# Patient Record
Sex: MALE | Race: WHITE | Employment: UNEMPLOYED | URBAN - NONMETROPOLITAN AREA
[De-identification: names, ages, dates, MRNs, and addresses within clinical notes are randomized per-mention and may not be internally consistent; named-entity substitution may affect disease eponyms.]

---

## 2020-01-10 ENCOUNTER — APPOINTMENT (OUTPATIENT)
Dept: GENERAL RADIOLOGY | Age: 45
End: 2020-01-10
Payer: COMMERCIAL

## 2020-01-10 ENCOUNTER — HOSPITAL ENCOUNTER (EMERGENCY)
Age: 45
Discharge: HOME | End: 2020-01-10
Payer: COMMERCIAL

## 2020-01-10 VITALS
DIASTOLIC BLOOD PRESSURE: 88 MMHG | RESPIRATION RATE: 18 BRPM | TEMPERATURE: 98.4 F | HEART RATE: 87 BPM | WEIGHT: 200 LBS | OXYGEN SATURATION: 98 % | SYSTOLIC BLOOD PRESSURE: 152 MMHG

## 2020-01-10 PROCEDURE — 99283 EMERGENCY DEPT VISIT LOW MDM: CPT

## 2020-01-10 PROCEDURE — 72040 X-RAY EXAM NECK SPINE 2-3 VW: CPT

## 2020-01-10 RX ORDER — CYCLOBENZAPRINE HCL 10 MG
10 TABLET ORAL NIGHTLY PRN
Qty: 21 TABLET | Refills: 0 | Status: SHIPPED | OUTPATIENT
Start: 2020-01-10 | End: 2020-01-17

## 2020-01-10 RX ORDER — NAPROXEN 500 MG/1
500 TABLET ORAL 2 TIMES DAILY
Qty: 14 TABLET | Refills: 0 | Status: SHIPPED | OUTPATIENT
Start: 2020-01-10 | End: 2022-08-01

## 2020-01-10 ASSESSMENT — PAIN DESCRIPTION - PAIN TYPE: TYPE: ACUTE PAIN

## 2020-01-10 ASSESSMENT — PAIN DESCRIPTION - DESCRIPTORS: DESCRIPTORS: ACHING

## 2020-01-10 ASSESSMENT — PAIN DESCRIPTION - LOCATION: LOCATION: NECK

## 2020-01-10 ASSESSMENT — PAIN DESCRIPTION - ORIENTATION: ORIENTATION: POSTERIOR

## 2020-01-10 ASSESSMENT — PAIN SCALES - GENERAL: PAINLEVEL_OUTOF10: 8

## 2020-01-10 NOTE — ED PROVIDER NOTES
[facility] [place] ED  EMERGENCY DEPARTMENT ENCOUNTER      Pt Name: Christina Watson  MRN: [id number]  [place] 1975  Date of evaluation: 1/10/2020  Provider: Suzette Meyer PA-C    CHIEF COMPLAINT       Chief Complaint   Patient presents with    Neck Pain     pt states ongoing for 15 years. Pt states he has DDD and arthritis       HISTORY OF PRESENT ILLNESS    Christina Watson is a 38 y.o. male who presents to the emergency department from home complains of pain in his neck ongoing for 15 years he has been told that he had generative disc disease and arthritis he states they wanted to do surgery on him about 5 years ago but he did not want to have that done now he starting to have pain shooting into his left arm over the past few months that he thinks is getting worse he wants to follow-up and see about what treatments he can get but he does not have a family doctor or anyone is taking care of his neck at this time he states he realizes the first few steps in the process of getting this taken care of but came here to see if we can help him start the process of having his neck checked out. Denies any new injury. This is a progressively worsening problem. Denies weakness. Triage notes and Nursing notes were reviewed by myself. Any discrepancies are addressed above. PAST MEDICAL HISTORY   No past medical history on file. SURGICAL HISTORY     No past surgical history on file. CURRENT MEDICATIONS       Discharge Medication List as of 1/10/2020 11:53 AM          ALLERGIES     Codeine    FAMILY HISTORY     No family history on file.      SOCIAL HISTORY       Social History     Socioeconomic History    Marital status: Single     Spouse name: Not on file    Number of children: Not on file    Years of education: Not on file    Highest education level: Not on file   Occupational History    Not on file   Social Needs    Financial resource strain: Not on file    Food insecurity:     Worry:

## 2020-01-14 ENCOUNTER — HOSPITAL ENCOUNTER (EMERGENCY)
Age: 45
Discharge: HOME | End: 2020-01-14
Payer: COMMERCIAL

## 2020-01-14 VITALS
SYSTOLIC BLOOD PRESSURE: 129 MMHG | TEMPERATURE: 97.4 F | HEART RATE: 106 BPM | WEIGHT: 200 LBS | RESPIRATION RATE: 16 BRPM | OXYGEN SATURATION: 97 % | DIASTOLIC BLOOD PRESSURE: 83 MMHG

## 2020-01-14 PROCEDURE — 6370000000 HC RX 637 (ALT 250 FOR IP): Performed by: PHYSICIAN ASSISTANT

## 2020-01-14 PROCEDURE — 99282 EMERGENCY DEPT VISIT SF MDM: CPT

## 2020-01-14 RX ORDER — GABAPENTIN 400 MG/1
400 CAPSULE ORAL 2 TIMES DAILY
COMMUNITY
End: 2022-08-01

## 2020-01-14 RX ORDER — PREDNISONE 10 MG/1
40 TABLET ORAL DAILY
Qty: 20 TABLET | Refills: 0 | Status: SHIPPED | OUTPATIENT
Start: 2020-01-14 | End: 2020-01-19

## 2020-01-14 RX ORDER — TRAMADOL HYDROCHLORIDE 50 MG/1
50 TABLET ORAL ONCE
Status: COMPLETED | OUTPATIENT
Start: 2020-01-14 | End: 2020-01-14

## 2020-01-14 RX ADMIN — TRAMADOL HYDROCHLORIDE 50 MG: 50 TABLET, FILM COATED ORAL at 14:04

## 2020-01-14 ASSESSMENT — ENCOUNTER SYMPTOMS
SHORTNESS OF BREATH: 0
BLOOD IN STOOL: 0
EYE DISCHARGE: 0
RHINORRHEA: 0
SORE THROAT: 0
BACK PAIN: 0
NAUSEA: 0
COUGH: 0
CHEST TIGHTNESS: 0
DIARRHEA: 0
VOMITING: 0
WHEEZING: 0
CONSTIPATION: 0
EYE REDNESS: 0
ABDOMINAL PAIN: 0

## 2020-01-14 ASSESSMENT — PAIN DESCRIPTION - LOCATION: LOCATION: NECK

## 2020-01-14 ASSESSMENT — PAIN DESCRIPTION - PAIN TYPE: TYPE: CHRONIC PAIN

## 2020-01-14 ASSESSMENT — PAIN SCALES - GENERAL
PAINLEVEL_OUTOF10: 7
PAINLEVEL_OUTOF10: 7

## 2020-01-14 ASSESSMENT — PAIN DESCRIPTION - DESCRIPTORS: DESCRIPTORS: CONSTANT

## 2020-01-14 ASSESSMENT — PAIN DESCRIPTION - ORIENTATION: ORIENTATION: POSTERIOR

## 2020-01-14 NOTE — ED PROVIDER NOTES
Restoration service: None     Active member of club or organization: None     Attends meetings of clubs or organizations: None     Relationship status: None    Intimate partner violence:     Fear of current or ex partner: None     Emotionally abused: None     Physically abused: None     Forced sexual activity: None   Other Topics Concern    None   Social History Narrative    None       SCREENINGS      @FLOW([id number])@      PHYSICAL EXAM    (up to 7 for level 4, 8 or more for level 5)     ED Triage Vitals [01/14/20 1323]   BP Temp Temp Source Pulse Resp SpO2 Height Weight   129/83 97.4 °F (36.3 °C) Oral 106 16 97 % -- 200 lb (90.7 kg)       Physical Exam  Vitals signs and nursing note reviewed. Constitutional:       General: He is not in acute distress. Appearance: He is well-developed. He is not diaphoretic. HENT:      Head: Normocephalic and atraumatic. Right Ear: External ear normal.      Left Ear: External ear normal.   Eyes:      General: No scleral icterus. Right eye: No discharge. Left eye: No discharge. Conjunctiva/sclera: Conjunctivae normal.   Neck:      Musculoskeletal: Full passive range of motion without pain, normal range of motion and neck supple. Normal range of motion. Muscular tenderness present. No spinous process tenderness. Trachea: No tracheal deviation. Comments: There is tenderness noted to the cervical spine region. Full range of motion. Intact sensation  Cardiovascular:      Rate and Rhythm: Normal rate and regular rhythm. Pulmonary:      Effort: Pulmonary effort is normal. No respiratory distress. Breath sounds: No stridor. No rhonchi. Abdominal:      General: There is no distension. Tenderness: There is no tenderness. Musculoskeletal: Normal range of motion. General: No tenderness or deformity. Skin:     General: Skin is warm and dry. Findings: No erythema or rash.    Neurological:      Mental Status: He is alert

## 2022-04-14 ENCOUNTER — TELEPHONE (OUTPATIENT)
Dept: FAMILY MEDICINE CLINIC | Age: 47
End: 2022-04-14

## 2022-08-01 ENCOUNTER — HOSPITAL ENCOUNTER (INPATIENT)
Age: 47
LOS: 4 days | Discharge: HOME | DRG: 750 | End: 2022-08-05
Attending: EMERGENCY MEDICINE | Admitting: PSYCHIATRY & NEUROLOGY
Payer: COMMERCIAL

## 2022-08-01 DIAGNOSIS — F23 ACUTE PSYCHOSIS (HCC): Primary | ICD-10-CM

## 2022-08-01 LAB
ACETAMINOPHEN LEVEL: <5 UG/ML (ref 10–30)
ALBUMIN SERPL-MCNC: 3.8 G/DL (ref 3.5–5.2)
ALP BLD-CCNC: 68 U/L (ref 40–129)
ALT SERPL-CCNC: 32 U/L (ref 5–41)
AMPHETAMINE SCREEN URINE: NEGATIVE
ANION GAP SERPL CALCULATED.3IONS-SCNC: 10 MMOL/L (ref 9–17)
AST SERPL-CCNC: 25 U/L
BARBITURATE SCREEN URINE: NEGATIVE
BENZODIAZEPINE SCREEN, URINE: NEGATIVE
BILIRUB SERPL-MCNC: 0.25 MG/DL (ref 0.3–1.2)
BUN BLDV-MCNC: 8 MG/DL (ref 6–20)
CALCIUM SERPL-MCNC: 9.1 MG/DL (ref 8.6–10.4)
CANNABINOID SCREEN URINE: NEGATIVE
CHLORIDE BLD-SCNC: 103 MMOL/L (ref 98–107)
CO2: 23 MMOL/L (ref 20–31)
COCAINE METABOLITE, URINE: NEGATIVE
CREAT SERPL-MCNC: 0.72 MG/DL (ref 0.7–1.2)
ETHANOL PERCENT: <0.01 %
ETHANOL: <10 MG/DL
FENTANYL URINE: POSITIVE
GFR AFRICAN AMERICAN: >60 ML/MIN
GFR NON-AFRICAN AMERICAN: >60 ML/MIN
GFR SERPL CREATININE-BSD FRML MDRD: ABNORMAL ML/MIN/{1.73_M2}
GLUCOSE BLD-MCNC: 97 MG/DL (ref 70–99)
HCT VFR BLD CALC: 42.9 % (ref 41–53)
HEMOGLOBIN: 14.3 G/DL (ref 13.5–17.5)
MCH RBC QN AUTO: 30.2 PG (ref 26–34)
MCHC RBC AUTO-ENTMCNC: 33.3 G/DL (ref 31–37)
MCV RBC AUTO: 90.8 FL (ref 80–100)
METHADONE SCREEN, URINE: NEGATIVE
OPIATES, URINE: NEGATIVE
OXYCODONE SCREEN URINE: NEGATIVE
PDW BLD-RTO: 13.8 % (ref 11.5–14.9)
PHENCYCLIDINE, URINE: NEGATIVE
PLATELET # BLD: 280 K/UL (ref 150–450)
PMV BLD AUTO: 6.8 FL (ref 6–12)
POTASSIUM SERPL-SCNC: 3.4 MMOL/L (ref 3.7–5.3)
RBC # BLD: 4.73 M/UL (ref 4.5–5.9)
SALICYLATE LEVEL: <1 MG/DL (ref 3–10)
SODIUM BLD-SCNC: 136 MMOL/L (ref 135–144)
TEST INFORMATION: ABNORMAL
TOTAL PROTEIN: 6.8 G/DL (ref 6.4–8.3)
TOXIC TRICYCLIC SC,BLOOD: ABNORMAL
TRICYCLIC ANTIDEP,URINE: NEGATIVE
TSH SERPL DL<=0.05 MIU/L-ACNC: 2.2 UIU/ML (ref 0.3–5)
WBC # BLD: 9.7 K/UL (ref 3.5–11)

## 2022-08-01 PROCEDURE — 99285 EMERGENCY DEPT VISIT HI MDM: CPT

## 2022-08-01 PROCEDURE — 80307 DRUG TEST PRSMV CHEM ANLYZR: CPT

## 2022-08-01 PROCEDURE — 36415 COLL VENOUS BLD VENIPUNCTURE: CPT

## 2022-08-01 PROCEDURE — 80143 DRUG ASSAY ACETAMINOPHEN: CPT

## 2022-08-01 PROCEDURE — 80179 DRUG ASSAY SALICYLATE: CPT

## 2022-08-01 PROCEDURE — 80053 COMPREHEN METABOLIC PANEL: CPT

## 2022-08-01 PROCEDURE — 99223 1ST HOSP IP/OBS HIGH 75: CPT | Performed by: INTERNAL MEDICINE

## 2022-08-01 PROCEDURE — 6370000000 HC RX 637 (ALT 250 FOR IP): Performed by: PSYCHIATRY & NEUROLOGY

## 2022-08-01 PROCEDURE — G0480 DRUG TEST DEF 1-7 CLASSES: HCPCS

## 2022-08-01 PROCEDURE — 1240000000 HC EMOTIONAL WELLNESS R&B

## 2022-08-01 PROCEDURE — 84443 ASSAY THYROID STIM HORMONE: CPT

## 2022-08-01 PROCEDURE — 85027 COMPLETE CBC AUTOMATED: CPT

## 2022-08-01 PROCEDURE — 6370000000 HC RX 637 (ALT 250 FOR IP): Performed by: EMERGENCY MEDICINE

## 2022-08-01 RX ORDER — DIPHENHYDRAMINE HYDROCHLORIDE 50 MG/ML
50 INJECTION INTRAMUSCULAR; INTRAVENOUS EVERY 6 HOURS PRN
Status: DISCONTINUED | OUTPATIENT
Start: 2022-08-01 | End: 2022-08-05 | Stop reason: HOSPADM

## 2022-08-01 RX ORDER — TRAZODONE HYDROCHLORIDE 50 MG/1
50 TABLET ORAL NIGHTLY PRN
Status: DISCONTINUED | OUTPATIENT
Start: 2022-08-01 | End: 2022-08-05 | Stop reason: HOSPADM

## 2022-08-01 RX ORDER — POTASSIUM CHLORIDE 20 MEQ/1
40 TABLET, EXTENDED RELEASE ORAL ONCE
Status: COMPLETED | OUTPATIENT
Start: 2022-08-01 | End: 2022-08-01

## 2022-08-01 RX ORDER — ACETAMINOPHEN 325 MG/1
650 TABLET ORAL EVERY 6 HOURS PRN
Status: DISCONTINUED | OUTPATIENT
Start: 2022-08-01 | End: 2022-08-05 | Stop reason: HOSPADM

## 2022-08-01 RX ORDER — IBUPROFEN 400 MG/1
400 TABLET ORAL EVERY 6 HOURS PRN
Status: DISCONTINUED | OUTPATIENT
Start: 2022-08-01 | End: 2022-08-05 | Stop reason: HOSPADM

## 2022-08-01 RX ORDER — POLYETHYLENE GLYCOL 3350 17 G/17G
17 POWDER, FOR SOLUTION ORAL DAILY PRN
Status: DISCONTINUED | OUTPATIENT
Start: 2022-08-01 | End: 2022-08-05 | Stop reason: HOSPADM

## 2022-08-01 RX ORDER — HALOPERIDOL 5 MG
5 TABLET ORAL EVERY 6 HOURS PRN
Status: DISCONTINUED | OUTPATIENT
Start: 2022-08-01 | End: 2022-08-05 | Stop reason: HOSPADM

## 2022-08-01 RX ORDER — HALOPERIDOL 5 MG/ML
5 INJECTION INTRAMUSCULAR EVERY 6 HOURS PRN
Status: DISCONTINUED | OUTPATIENT
Start: 2022-08-01 | End: 2022-08-05 | Stop reason: HOSPADM

## 2022-08-01 RX ORDER — HYDROXYZINE 50 MG/1
50 TABLET, FILM COATED ORAL 3 TIMES DAILY PRN
Status: DISCONTINUED | OUTPATIENT
Start: 2022-08-01 | End: 2022-08-05 | Stop reason: HOSPADM

## 2022-08-01 RX ORDER — ASCORBIC ACID 500 MG
500 TABLET ORAL DAILY
Status: ON HOLD | COMMUNITY
End: 2022-08-04 | Stop reason: HOSPADM

## 2022-08-01 RX ORDER — MAGNESIUM HYDROXIDE/ALUMINUM HYDROXICE/SIMETHICONE 120; 1200; 1200 MG/30ML; MG/30ML; MG/30ML
30 SUSPENSION ORAL EVERY 6 HOURS PRN
Status: DISCONTINUED | OUTPATIENT
Start: 2022-08-01 | End: 2022-08-05 | Stop reason: HOSPADM

## 2022-08-01 RX ORDER — NICOTINE 21 MG/24HR
1 PATCH, TRANSDERMAL 24 HOURS TRANSDERMAL DAILY
Status: DISCONTINUED | OUTPATIENT
Start: 2022-08-01 | End: 2022-08-05 | Stop reason: HOSPADM

## 2022-08-01 RX ADMIN — ACETAMINOPHEN 650 MG: 325 TABLET, FILM COATED ORAL at 16:04

## 2022-08-01 RX ADMIN — POTASSIUM CHLORIDE 40 MEQ: 20 TABLET, EXTENDED RELEASE ORAL at 13:37

## 2022-08-01 RX ADMIN — HYDROXYZINE HYDROCHLORIDE 50 MG: 50 TABLET, FILM COATED ORAL at 16:02

## 2022-08-01 ASSESSMENT — PAIN - FUNCTIONAL ASSESSMENT: PAIN_FUNCTIONAL_ASSESSMENT: NONE - DENIES PAIN

## 2022-08-01 ASSESSMENT — SLEEP AND FATIGUE QUESTIONNAIRES
DO YOU HAVE DIFFICULTY SLEEPING: YES
DO YOU HAVE DIFFICULTY SLEEPING: YES
AVERAGE NUMBER OF SLEEP HOURS: 3
AVERAGE NUMBER OF SLEEP HOURS: 4
DO YOU USE A SLEEP AID: NO
SLEEP PATTERN: DIFFICULTY FALLING ASLEEP;EARLY AWAKENING

## 2022-08-01 ASSESSMENT — LIFESTYLE VARIABLES
HOW OFTEN DO YOU HAVE A DRINK CONTAINING ALCOHOL: NEVER
HOW MANY STANDARD DRINKS CONTAINING ALCOHOL DO YOU HAVE ON A TYPICAL DAY: PATIENT DOES NOT DRINK

## 2022-08-01 ASSESSMENT — PAIN SCALES - GENERAL
PAINLEVEL_OUTOF10: 0
PAINLEVEL_OUTOF10: 4

## 2022-08-01 ASSESSMENT — ENCOUNTER SYMPTOMS
COLOR CHANGE: 0
ABDOMINAL PAIN: 0
RHINORRHEA: 0
FACIAL SWELLING: 0
SHORTNESS OF BREATH: 0
BACK PAIN: 0
COUGH: 0
DIARRHEA: 0
CONSTIPATION: 0
CHEST TIGHTNESS: 0
SORE THROAT: 0
EYE PAIN: 0
VOMITING: 0
EYE REDNESS: 0
TROUBLE SWALLOWING: 0
SINUS PRESSURE: 0
BLOOD IN STOOL: 0
WHEEZING: 0
NAUSEA: 0
EYE DISCHARGE: 0

## 2022-08-01 ASSESSMENT — PAIN DESCRIPTION - LOCATION: LOCATION: HEAD
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
## 2022-08-01 NOTE — BH NOTE
[facility]  Admission Note     Admission Type:   Admission Type: Voluntary    Reason for admission:     Off medications, hearing voices telling him to hurt other people     Addictive Behavior:   Addictive Behavior  In the Past 3 Months, Have You Felt or Has Someone Told You That You Have a Problem With  : None    Medical Problems:   No past medical history on file. Status EXAM:  Mental Status and Behavioral Exam  Normal: No  Level of Assistance: Independent/Self  Facial Expression: Flat  Affect: Appropriate  Level of Consciousness: Alert  Frequency of Checks: 4 times per hour, close  Mood:Normal: No  Mood: Depressed, Anxious  Motor Activity:Normal: No  Motor Activity: Decreased  Eye Contact: Good  Observed Behavior: Cooperative  Sexual Misconduct History: Current - no  Preception: [place] to person, [place] to time, [place] to place, [place] to situation  Attention:Normal: No  Thought Processes: Other (comment) (within normal limits)  Thought Content:Normal: No  Thought Content: Preoccupations  Depression Symptoms: Change in energy level, Impaired concentration  Anxiety Symptoms: Generalized  Lori Symptoms: No problems reported or observed. Hallucinations: Auditory (comment) (man and a woman talking)  Delusions: No  Memory:Normal: Yes  Insight and Judgment: No  Insight and Judgment: Poor judgment, Poor insight    Tobacco Screening:  Practical Counseling, on admission, doc X, if applicable and completed (first 3 are required if patient doesn't refuse):             ( x) Recognizing danger situations (included triggers and roadblocks)                    ( x) Coping skills (new ways to manage stress,relaxation techniques, changing routine, distraction)                                                           (x ) Basic information about quitting (benefits of quitting, techniques in how to quit, available resources  ( ) Referral for counseling faxed to [facility] ( ) Patient refused counseling  ( ) Patient has not smoked in the last 30 days    Metabolic Screening:    No results found for: LABA1C    No results found for: CHOL  No results found for: TRIG  No results found for: HDL  No components found for: LDLCAL  No results found for: LABVLDL      Body mass index is 28.7 kg/m².     BP Readings from Last 2 Encounters:   08/01/22 125/87   01/14/20 129/83           Pt admitted with followings belongings:  Dental Appliances: None  Vision - Corrective Lenses: None  Hearing Aid: None  Jewelry: None  Body Piercings Removed: N/A  Clothing: Shirt, Shorts, Footwear  Other Valuables: Alli Anderson RN

## 2022-08-01 NOTE — ED PROVIDER NOTES
[address]  EMERGENCY DEPARTMENT ENCOUNTER      Pt Name: Esther Pérez  MRN: [id number]  [place] 1975  Date of evaluation: 8/1/22      CHIEF COMPLAINT       Chief Complaint   Patient presents with    Mental Health Problem         HISTORY OF PRESENT ILLNESS    Esther Pérez is a 56 y.o. male who presents complaining of Psychiatric Evaluation. Patient is here complaining of hearing voices telling him to hurt other people. Patient states because of this he will not leave his house for fear of hurting people. Patient states he does have a history of things like this before and has been hospitalized and has not been taking his medications for the last couple months. Patient denies drug or alcohol use. Patient denies suicidal ideation. Patient denies any somatic complaints. REVIEW OF SYSTEMS       Review of Systems   Constitutional:  Negative for activity change, appetite change, chills, diaphoresis and fever. HENT:  Negative for congestion, ear pain, facial swelling, nosebleeds, rhinorrhea, sinus pressure, sore throat and trouble swallowing. Eyes:  Negative for pain, discharge and redness. Respiratory:  Negative for cough, chest tightness, shortness of breath and wheezing. Cardiovascular:  Negative for chest pain, palpitations and leg swelling. Gastrointestinal:  Negative for abdominal pain, blood in stool, constipation, diarrhea, nausea and vomiting. Genitourinary:  Negative for difficulty urinating, dysuria, flank pain, frequency, genital sores and hematuria. Musculoskeletal:  Negative for arthralgias, back pain, gait problem, joint swelling, myalgias and neck pain. Skin:  Negative for color change, pallor, rash and wound. Neurological:  Negative for dizziness, tremors, seizures, syncope, speech difficulty, weakness, numbness and headaches. Psychiatric/Behavioral:  Positive for hallucinations.  Negative for confusion, decreased concentration, self-injury, sleep disturbance and suicidal ideas. Homicidal ideation     PAST MEDICAL HISTORY   No past medical history on file. SURGICAL HISTORY     No past surgical history on file. CURRENT MEDICATIONS       Previous Medications    GABAPENTIN (NEURONTIN) 400 MG CAPSULE    Take 400 mg by mouth 2 times daily. NAPROXEN (NAPROSYN) 500 MG TABLET    Take 1 tablet by mouth 2 times daily       ALLERGIES     is allergic to codeine. SOCIAL HISTORY      reports that he has been smoking cigarettes. He has been smoking an average of 1 pack per day. He has never used smokeless tobacco.    PHYSICAL EXAM     INITIAL VITALS: BP (!) 154/89   Pulse 98   Temp 98.4 °F (36.9 °C)   Resp 18   SpO2 99%      Physical Exam  Constitutional:       General: He is not in acute distress. Appearance: He is well-developed. He is not diaphoretic. HENT:      Head: Normocephalic and atraumatic. Eyes:      General: No scleral icterus. Right eye: No discharge. Left eye: No discharge. Conjunctiva/sclera: Conjunctivae normal.      Pupils: Pupils are equal, round, and reactive to light. Cardiovascular:      Rate and Rhythm: Normal rate and regular rhythm. Heart sounds: Normal heart sounds. No murmur heard. No friction rub. No gallop. Pulmonary:      Effort: Pulmonary effort is normal. No respiratory distress. Breath sounds: Normal breath sounds. No wheezing or rales. Neurological:      Mental Status: He is alert and oriented to person, place, and time. Psychiatric:         Attention and Perception: Attention normal.         Mood and Affect: Affect normal.         Speech: Speech normal.         Behavior: Behavior normal.         Thought Content: Thought content includes homicidal ideation. Thought content does not include suicidal ideation. DIAGNOSTIC RESULTS     LABS: All lab results were reviewed by myself, and all abnormals are listed below.   Labs Reviewed   TOX SCR, BLD, ED - Abnormal; Notable for the following components:       Result Value    Acetaminophen Level <5 (*)     Salicylate Lvl <1 (*)     All other components within normal limits   COMPREHENSIVE METABOLIC PANEL - Abnormal; Notable for the following components:    Potassium 3.4 (*)     Total Bilirubin 0.25 (*)     All other components within normal limits   URINE DRUG SCREEN - Abnormal; Notable for the following components:    Fentanyl, Ur POSITIVE (*)     All other components within normal limits   CBC   TSH WITH REFLEX   DRUG SCREEN TRICYCLIC URINE         MEDICAL DECISION MAKING:     We will do the medical work-up on this patient by think he would benefit from hospitalization. EMERGENCY DEPARTMENT COURSE:   Vitals:    Vitals:    08/01/22 1039 08/01/22 1041   BP: (!) 154/89    Pulse: 98    Resp: 18    Temp:  98.4 °F (36.9 °C)   SpO2: 99%        The patient was given the following medications while in the emergency department:  Orders Placed This Encounter   Medications    potassium chloride (KLOR-CON M) extended release tablet 40 mEq       -------------------------  1:06 PM EDT  Patient has been evaluated is medically cleared and will be admitted to [facility] for further psychiatric evaluation and treatment. FINAL IMPRESSION      1. Acute psychosis ([address]          DISPOSITION/PLAN   DISPOSITION Admitted 08/01/2022 01:04:56 PM      PATIENT REFERREDTO:  No follow-up provider specified.     DISCHARGEMEDICATIONS:  New Prescriptions    No medications on file       (Please note that portions of this note were completed with a voice recognition program.  Efforts were made to edit thedictations but occasionally words are mis-transcribed.)    Melia Valencia MD  Attending Emergency Physician                      Melia Valencia MD  08/01/22 7038

## 2022-08-01 NOTE — PROGRESS NOTES
Medication History completed:    New medications: vitamin C, vitamin D    Medications discontinued: gabapentin, naproxen    Changes to dosing: none    Stated allergies: The patient reports a rash with codeine. Other pertinent information: Medications confirmed with patient. He denies use of prescription medications.      Thank you,  Simba Bernal, PharmD, BCPS  [phone]

## 2022-08-02 PROBLEM — F25.0 SCHIZOAFFECTIVE DISORDER, BIPOLAR TYPE (HCC): Status: ACTIVE | Noted: 2022-08-02

## 2022-08-02 PROCEDURE — 90792 PSYCH DIAG EVAL W/MED SRVCS: CPT | Performed by: PSYCHIATRY & NEUROLOGY

## 2022-08-02 PROCEDURE — 6370000000 HC RX 637 (ALT 250 FOR IP): Performed by: PSYCHIATRY & NEUROLOGY

## 2022-08-02 PROCEDURE — APPSS60 APP SPLIT SHARED TIME 46-60 MINUTES

## 2022-08-02 PROCEDURE — 1240000000 HC EMOTIONAL WELLNESS R&B

## 2022-08-02 RX ORDER — PALIPERIDONE 3 MG/1
3 TABLET, EXTENDED RELEASE ORAL DAILY
Status: DISCONTINUED | OUTPATIENT
Start: 2022-08-02 | End: 2022-08-03

## 2022-08-02 RX ORDER — DIVALPROEX SODIUM 500 MG/1
500 TABLET, EXTENDED RELEASE ORAL DAILY
Status: DISCONTINUED | OUTPATIENT
Start: 2022-08-02 | End: 2022-08-05 | Stop reason: HOSPADM

## 2022-08-02 RX ADMIN — DIVALPROEX SODIUM 500 MG: 500 TABLET, EXTENDED RELEASE ORAL at 11:39

## 2022-08-02 RX ADMIN — PALIPERIDONE 3 MG: 3 TABLET, EXTENDED RELEASE ORAL at 11:39

## 2022-08-02 RX ADMIN — HYDROXYZINE HYDROCHLORIDE 50 MG: 50 TABLET, FILM COATED ORAL at 20:14

## 2022-08-02 RX ADMIN — HALOPERIDOL 5 MG: 5 TABLET ORAL at 08:33

## 2022-08-02 RX ADMIN — TRAZODONE HYDROCHLORIDE 50 MG: 50 TABLET ORAL at 20:44

## 2022-08-02 RX ADMIN — ACETAMINOPHEN 650 MG: 325 TABLET, FILM COATED ORAL at 08:33

## 2022-08-02 RX ADMIN — HYDROXYZINE HYDROCHLORIDE 50 MG: 50 TABLET, FILM COATED ORAL at 12:27

## 2022-08-02 ASSESSMENT — PAIN SCALES - GENERAL: PAINLEVEL_OUTOF10: 0

## 2022-08-02 ASSESSMENT — LIFESTYLE VARIABLES
HOW MANY STANDARD DRINKS CONTAINING ALCOHOL DO YOU HAVE ON A TYPICAL DAY: PATIENT DOES NOT DRINK
HOW OFTEN DO YOU HAVE A DRINK CONTAINING ALCOHOL: NEVER

## 2022-08-02 NOTE — PLAN OF CARE
[facility]  Initial Interdisciplinary Treatment Plan NO      Original treatment plan Date & Time: 8/2/2022 1259    Admission Type:  Admission Type: Voluntary    Reason for admission:        Estimated Length of Stay:  5-7days  Estimated Discharge Date: to be determined by physician    PATIENT STRENGTHS:  Patient Strengths:   Patient Strengths and Limitations:Limitations: Difficult relationships / poor social skills, Tendency to isolate self (Patient has auditory and visual hallucinations that make it uncomfortable to be around others.)  Addictive Behavior: Addictive Behavior  In the Past 3 Months, Have You Felt or Has Someone Told You That You Have a Problem With  : None  Medical Problems:No past medical history on file. Status EXAM:Mental Status and Behavioral Exam  Normal: No  Level of Assistance: Independent/Self  Facial Expression: Flat  Affect: Appropriate  Level of Consciousness: Alert  Frequency of Checks: 4 times per hour, close  Mood:Normal: No  Mood: Depressed, Anxious  Motor Activity:Normal: Yes  Motor Activity: Decreased  Eye Contact: Fair  Observed Behavior: Cooperative  Sexual Misconduct History: Current - no  Preception: [place] to person, [place] to place, [place] to time, [place] to situation  Attention:Normal: No  Attention: Distractible  Thought Processes: Circumstantial  Thought Content:Normal: No  Thought Content: Preoccupations  Depression Symptoms: Isolative, Loss of interest, Impaired concentration  Anxiety Symptoms: Generalized  Lori Symptoms: No problems reported or observed. Hallucinations:  Auditory (comment) (man and a women talking)  Delusions: No  Memory:Normal: Yes  Insight and Judgment: No  Insight and Judgment: Poor judgment, Poor insight    EDUCATION:   Learner Progress Toward Treatment Goals: reviewed group plans and strategies for care    Method:group therapy, medication compliance, individualized assessments and care planning    Outcome: needs reinforcement    PATIENT GOALS:   Short term: decrease auditory hallucinations and link with mental health facility  Long term: staying out of the hospital and staying on track    PLAN/TREATMENT RECOMMENDATIONS:     continue group therapy , medications compliance, goal setting, individualized assessments and care, continue to monitor pt on unit      SHORT-TERM GOALS:   Time frame for Short-Term Goals: 5-7 days    LONG-TERM GOALS:  Time frame for Long-Term Goals: 6 months  Members Present in Team Meeting: See Signature Sheet    Tyrone Cardenas RN

## 2022-08-02 NOTE — PLAN OF CARE
Problem: Anxiety  Goal: Will report anxiety at manageable levels  Description: INTERVENTIONS:  1. Administer medication as ordered  2. Teach and rehearse alternative coping skills  3. Provide emotional support with 1:1 interaction with staff  8/2/2022 1352 by Abdon Crawford LPN  Outcome: Progressing  8/2/2022 1352 by Abdon Crawford LPN  Outcome: Progressing  Patient states he will continue being compliant with meds and speak to staff if anxiety increases or worsens. 8/2/2022 1259 by Cindy Cuellar RN  Outcome: Progressing  8/1/2022 2352 by Renetta Mott RN  Outcome: Progressing  Flowsheets (Taken 8/1/2022 1506 by Kiley Junior RN)  Will report anxiety at manageable levels: Administer medication as ordered     Problem: Coping  Goal: Pt/Family able to verbalize concerns and demonstrate effective coping strategies  Description: INTERVENTIONS:  1. Assist patient/family to identify coping skills, available support systems and cultural and spiritual values  2. Provide emotional support, including active listening and acknowledgement of concerns of patient and caregivers  3. Reduce environmental stimuli, as able  4. Instruct patient/family in relaxation techniques, as appropriate  5. Assess for spiritual pain/suffering and initiate Spiritual Care, Psychosocial Clinical Specialist consults as needed  8/2/2022 1352 by Abdon Crawford LPN  Outcome: Progressing  8/2/2022 1352 by Abdon Crawford LPN  Outcome: Progressing  Patient states he is feeling progress towards working with his coping skills and will continue to work on this with staffs help.    8/2/2022 1259 by Cindy Cuellar RN  Outcome: Progressing  8/1/2022 2352 by Renetta Mott RN  Outcome: Progressing
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
## 2022-08-02 NOTE — H&P
use.  Drug Use:  has no history on file for drug use. Family History:     No family history on file. Review of Systems:     Positive and Negative as described in HPI. CONSTITUTIONAL:  negative for fevers, chills, sweats, fatigue, weight loss  HEENT:  negative for vision, hearing changes, runny nose, throat pain  RESPIRATORY:  negative for shortness of breath, cough, congestion, wheezing  CARDIOVASCULAR:  negative for chest pain, palpitations  GASTROINTESTINAL:  negative for nausea, vomiting, diarrhea, constipation, change in bowel habits, abdominal pain   GENITOURINARY:  negative for difficulty of urination, burning with urination, frequency   INTEGUMENT:  negative for rash, skin lesions, easy bruising   HEMATOLOGIC/LYMPHATIC:  negative for swelling/edema   ALLERGIC/IMMUNOLOGIC:  negative for urticaria , itching  ENDOCRINE:  negative increase in drinking, increase in urination, hot or cold intolerance  MUSCULOSKELETAL:  negative joint pains, muscle aches, swelling of joints  NEUROLOGICAL:  negative for headaches, dizziness, lightheadedness, numbness, pain, tingling extremities      Physical Exam:   /65   Pulse 88   Temp 98.7 °F (37.1 °C) (Oral)   Resp 12   Ht 5' 10\" (1.778 m)   Wt 200 lb (90.7 kg)   SpO2 99%   BMI 28.70 kg/m²   Temp (24hrs), Av.6 °F (37 °C), Min:98.4 °F (36.9 °C), Max:98.7 °F (37.1 °C)    No results for input(s): POCGLU in the last 72 hours.   No intake or output data in the 24 hours ending 22  Multiple tattoos noted including on the face  General Appearance: alert, well appearing, and in no acute distress  Mental status: oriented to person, place, and time  Head: normocephalic, atraumatic  Eye: no icterus, redness, pupils equal and reactive, extraocular eye movements intact, conjunctiva clear  Ear: normal external ear, no discharge, hearing intact  Nose: no drainage noted  Mouth: mucous membranes moist  Neck: supple, no carotid bruits, thyroid not palpable  Lungs: Bilateral equal air entry, clear to ausculation, no wheezing, rales or rhonchi, normal effort  Cardiovascular: normal rate, regular rhythm, no murmur, gallop, rub  Abdomen: Soft, nontender, nondistended, normal bowel sounds, no hepatomegaly or splenomegaly  Neurologic: There are no new focal motor or sensory deficits, normal muscle tone and bulk, no abnormal sensation, normal speech, cranial nerves II through XII grossly intact  Skin: No gross lesions, rashes, bruising or bleeding on exposed skin area  Extremities: peripheral pulses palpable, no pedal edema or calf pain with palpation      Investigations:      Laboratory Testing:  Recent Results (from the past 24 hour(s))   Urine Drug Screen    Collection Time: 08/01/22 12:06 PM   Result Value Ref Range    Amphetamine Screen, Ur NEGATIVE NEGATIVE    Barbiturate Screen, Ur NEGATIVE NEGATIVE    Benzodiazepine Screen, Urine NEGATIVE NEGATIVE    Cocaine Metabolite, Urine NEGATIVE NEGATIVE    Methadone Screen, Urine NEGATIVE NEGATIVE    Opiates, Urine NEGATIVE NEGATIVE    Phencyclidine, Urine NEGATIVE NEGATIVE    Cannabinoid Scrn, Ur NEGATIVE NEGATIVE    Oxycodone Screen, Ur NEGATIVE NEGATIVE    Fentanyl, Ur POSITIVE (A) NEGATIVE    Test Information       Assay provides medical screening only. The absence of expected drug(s) and/or metabolite(s) may indicate diluted or adulterated urine, limitations of testing or timing of collection.    Drug screen, tricyclic    Collection Time: 08/01/22 12:06 PM   Result Value Ref Range    Tricyclic Antidep,Urine NEGATIVE NEGATIVE   TOX SCR, BLD, ED    Collection Time: 08/01/22 12:07 PM   Result Value Ref Range    Acetaminophen Level <5 (L) 10 - 30 ug/mL    Ethanol <10 <10 mg/dL    Ethanol percent <8.874 %    Salicylate Lvl <1 (L) 3 - 10 mg/dL    Toxic Tricyclic Sc,Blood WRONG TEST ORDERED NEGATIVE   CBC    Collection Time: 08/01/22 12:07 PM   Result Value Ref Range    WBC 9.7 3.5 - 11.0 k/uL    RBC 4.73 4.5 - 5.9 m/uL Hemoglobin 14.3 13.5 - 17.5 g/dL    Hematocrit 42.9 41 - 53 %    MCV 90.8 80 - 100 fL    MCH 30.2 26 - 34 pg    MCHC 33.3 31 - 37 g/dL    RDW 13.8 11.5 - 14.9 %    Platelets 600 903 - 455 k/uL    MPV 6.8 6.0 - 12.0 fL   Comprehensive Metabolic Panel    Collection Time: 08/01/22 12:07 PM   Result Value Ref Range    Glucose 97 70 - 99 mg/dL    BUN 8 6 - 20 mg/dL    Creatinine 0.72 0.70 - 1.20 mg/dL    Calcium 9.1 8.6 - 10.4 mg/dL    Sodium 136 135 - 144 mmol/L    Potassium 3.4 (L) 3.7 - 5.3 mmol/L    Chloride 103 98 - 107 mmol/L    CO2 23 20 - 31 mmol/L    Anion Gap 10 9 - 17 mmol/L    Alkaline Phosphatase 68 40 - 129 U/L    ALT 32 5 - 41 U/L    AST 25 <40 U/L    Total Bilirubin 0.25 (L) 0.3 - 1.2 mg/dL    Total Protein 6.8 6.4 - 8.3 g/dL    Albumin 3.8 3.5 - 5.2 g/dL    GFR Non-African American >60 >60 mL/min    GFR African American >60 >60 mL/min    GFR Comment         TSH with Reflex    Collection Time: 08/01/22 12:07 PM   Result Value Ref Range    TSH 2.20 0.30 - 5.00 uIU/mL       Imaging/Diagnostics:  No results found. Assessment :      Hospital Problems             Last Modified POA    * (Principal) Acute psychosis ([facility] [address] 8/1/2022 Yes       Plan:   60-year-old gentleman  Hypokalemia potassium less than 3.540 M EQ's oral 1 time  Labs reviewed TSH 2.20  Urine tox positive for fentanyl  Will repeat labs and monitor electrolytes    Belia Beauchamp MD  8/1/2022  8:11 PM    Copy sent to Dr. Esther Nicholson primary care provider on file. Please note that this chart was generated using voice recognition Dragon dictation software. Although every effort was made to ensure the accuracy of this automated transcription, some errors in transcription may have occurred.
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
## 2022-08-02 NOTE — PLAN OF CARE
Problem: Anxiety  Goal: Will report anxiety at manageable levels  Outcome: Progressing     Problem: Coping  Goal: Pt/Family able to verbalize concerns and demonstrate effective coping strategies  Outcome: Progressing     Patient denies suicidal and homicidal ideation at this time. Patient was seclusive to his room most of the evening and out for needs only. Patient was unable to verbalize coping skills at this time. Patient is free of self harm at this time. Patient agrees to seek out staff if thoughts to harm self arise. Staff will provide support and reassurance as needed. Safety checks maintained every 15 minutes.

## 2022-08-02 NOTE — GROUP NOTE
Group Therapy Note    Date: 8/2/2022    Group Start Time: 4535  Group End Time: 2565  Group Topic: Psychoeducation    NAI Fletcher, CTRS    Psych-Ed/Relapse Prevention Group Note        Date: August 2, 2022 Start Time: 1:45pm   End Time: 2:20pm       Number of Participants in Group & Unit Census:  3    Topic: Apples to Apples    Goal of Group:Patient will improve interpersonal communication and formulating appropriate judgements. Comments:     Patient did not participate in Psych-Ed/Relapse Prevention group, despite staff encouragement and explanation of benefits. Patient remain seclusive to self. Q15 minute safety checks maintained for patient safety and will continue to encourage patient to attend unit programming.           Signature:  Kadi Fletcher, [address]

## 2022-08-02 NOTE — GROUP NOTE
Group Therapy Note    Date: 8/2/2022    Group Start Time: 1000  Group End Time: 9730  Group Topic: Psychotherapy    [facility]    RAMON Hernandez LSW        Group Therapy Note    Attendees:4/21       Patient refused to attend psychotherapy group at 10:00 am after encouragement from staff. 1:1 talk time provided as alternative to group session.     Discipline Responsible: /Counselor      Signature:  RAMON Hernandez LSW

## 2022-08-02 NOTE — PROGRESS NOTES
Behavioral Services  Medicare Certification Upon Admission    I certify that this patient's inpatient psychiatric hospital admission is medically necessary for:    [x] (1) Treatment which could reasonably be expected to improve this patient's condition,       [x] (2) Or for diagnostic study;     AND     [x](2) The inpatient psychiatric services are provided while the individual is under the care of a physician and are included in the individualized plan of care.     Estimated length of stay/service 4-7 days     Plan for post-hospital care home with outpatient      Electronically signed by Quinten Crawford MD on 8/2/2022 at 10:20 AM

## 2022-08-02 NOTE — GROUP NOTE
Group Therapy Note    Date: 8/2/2022    Group Start Time: 1100  Group End Time: 0742  Group Topic: Psychoeducation    SOFÍA [place]    Psych-Ed/Relapse Prevention Group Note        Date: August 2, 2022 Start Time: 11am  End Time: 11:45am      Number of Participants in Group & Unit Census:  4    Topic: Self-Care list     Goal of Group:Patient will identify multiple forms of healthy  and safe self care. Comments:     Patient did not participate in Psych-Ed/Relapse Prevention group, despite staff encouragement and explanation of benefits. Patient remain seclusive to self. Q15 minute safety checks maintained for patient safety and will continue to encourage patient to attend unit programming.           Signature:  Renetta Noel [place]

## 2022-08-03 PROCEDURE — 99232 SBSQ HOSP IP/OBS MODERATE 35: CPT | Performed by: INTERNAL MEDICINE

## 2022-08-03 PROCEDURE — 6370000000 HC RX 637 (ALT 250 FOR IP): Performed by: PSYCHIATRY & NEUROLOGY

## 2022-08-03 PROCEDURE — 99232 SBSQ HOSP IP/OBS MODERATE 35: CPT | Performed by: PSYCHIATRY & NEUROLOGY

## 2022-08-03 PROCEDURE — APPSS15 APP SPLIT SHARED TIME 0-15 MINUTES: Performed by: NURSE PRACTITIONER

## 2022-08-03 PROCEDURE — 1240000000 HC EMOTIONAL WELLNESS R&B

## 2022-08-03 RX ORDER — HALOPERIDOL 5 MG
5 TABLET ORAL 2 TIMES DAILY
Status: DISCONTINUED | OUTPATIENT
Start: 2022-08-03 | End: 2022-08-05 | Stop reason: HOSPADM

## 2022-08-03 RX ADMIN — HYDROXYZINE HYDROCHLORIDE 50 MG: 50 TABLET, FILM COATED ORAL at 08:00

## 2022-08-03 RX ADMIN — HALOPERIDOL 5 MG: 5 TABLET ORAL at 11:25

## 2022-08-03 RX ADMIN — TRAZODONE HYDROCHLORIDE 50 MG: 50 TABLET ORAL at 20:20

## 2022-08-03 RX ADMIN — PALIPERIDONE 3 MG: 3 TABLET, EXTENDED RELEASE ORAL at 08:00

## 2022-08-03 RX ADMIN — DIVALPROEX SODIUM 500 MG: 500 TABLET, EXTENDED RELEASE ORAL at 08:00

## 2022-08-03 RX ADMIN — HALOPERIDOL 5 MG: 5 TABLET ORAL at 20:20

## 2022-08-03 RX ADMIN — HALOPERIDOL 5 MG: 5 TABLET ORAL at 13:35

## 2022-08-03 RX ADMIN — HYDROXYZINE HYDROCHLORIDE 50 MG: 50 TABLET, FILM COATED ORAL at 20:20

## 2022-08-03 NOTE — GROUP NOTE
Group Therapy Note    Date: 8/3/2022    Group Start Time: 1330  Group End Time: 1400  Group Topic: Healthy Living/Wellness    SOFÍA Herrera    Health/Wellness Group Note        Date: August 3, 2022 Start Time: 1:30pm  End Time: 2:00 pm      Number of Participants in Group & Unit Census:  6    Topic: Walking Group     Goal of Group: Patient will utilize physical fitness for positive coping and healthy leisure. Comments:     Patient did not participate in Health/Wellness group, despite staff encouragement and explanation of benefits. Patient remain seclusive to self. Q15 minute safety checks maintained for patient safety and will continue to encourage patient to attend unit programming.           Signature:  Kole Romero

## 2022-08-03 NOTE — GROUP NOTE
Group Therapy Note    Date: 8/3/2022    Group Start Time: 0900  Group End Time: 0920  Group Topic: Community Meeting    NAI Mayes        Group Therapy Note    Attendees: 6/18          Goal of Group:Discuss daily goals       Comments:     Patient did not participate in [employer] group, despite staff encouragement and explanation of benefits. Patient remain seclusive to self. Q15 minute safety checks maintained for patient safety and will continue to encourage patient to attend unit programming.

## 2022-08-03 NOTE — PROGRESS NOTES
Daily Progress Note  8/3/2022    Patient Name: Emily Ross: Command auditory hallucinations with homicidal ideation         SUBJECTIVE:      Patient is seen today for a follow up assessment. Daisha Jeffries is interviewed today initially at the nurses station and later in private. He reports an improvement in his mood and overall irritability. He reports that he has been off of his medications \"for a long time\" and that he is hopeful the auditory hallucinations will improve now that he has resumed taking them. He reports that he has not been participating in any group activity because he does not feel comfortable around others, specifically with his thoughts to harm others. He reports that he is able to contract for safety on the inpatient unit however continues to feel that he needs some time before he can safely contract for safety in the community. He did receive emergency oral medication today to help with increased agitation, the additional medication was effective. Appetite:  [x] Adequate/Unchanged  [] Increased  [] Decreased      Sleep:       [x] Adequate/Unchanged  [] Fair  [] Poor      Group Attendance on Unit:   [] Yes   [] Selectively    [x] No    Compliant with scheduled medications: [x] Yes  [] No    Received emergency medications in past 24 hrs: [x] Yes   [] No, received oral Haldol for agitation at 1130    Medication Side Effects: Denies          Mental Status Exam  Level of consciousness: Alert and awake   Appearance: Appropriate attire for setting, standing, with fair  grooming and hygiene   Behavior/Motor: Approachable, engages with interviewer  Attitude toward examiner: Cooperative, attentive, fair eye contact  Speech: Normal rate and tone, low volume  Mood: \"A little better\"  Affect: Congruent, isolative  Thought processes: Linear and coherent  Thought content: Endorses homicidal ideation  Suicidal Ideation: Denies suicidal ideations, contracts for safety on the unit. Delusions: No evidence of delusions. Perceptual Disturbance: Endorses command auditory hallucinations, reports that they are a little less intense, patient does not appear to be responding to internal stimuli. Cognition: Oriented to self, location, time, and situation  Memory: intact  Insight: fair   Judgement: fair       Data   height is 5' 10\" (1.778 m) and weight is 200 lb (90.7 kg). His oral temperature is 97.9 °F (36.6 °C). His blood pressure is 113/76 and his pulse is 83. His respiration is 14 and oxygen saturation is 99%.    Labs:   Admission on 08/01/2022   Component Date Value Ref Range Status    Acetaminophen Level 08/01/2022 <5 (A) 10 - 30 ug/mL Final    Ethanol 08/01/2022 <10  <10 mg/dL Final    Ethanol percent 08/01/2022 <5.480  % Final    Salicylate Lvl 85/11/8955 <1 (A) 3 - 10 mg/dL Final    Toxic Tricyclic Sc,Blood 15/82/3234 WRONG TEST ORDERED  NEGATIVE Final    WBC 08/01/2022 9.7  3.5 - 11.0 k/uL Final    RBC 08/01/2022 4.73  4.5 - 5.9 m/uL Final    Hemoglobin 08/01/2022 14.3  13.5 - 17.5 g/dL Final    Hematocrit 08/01/2022 42.9  41 - 53 % Final    MCV 08/01/2022 90.8  80 - 100 fL Final    MCH 08/01/2022 30.2  26 - 34 pg Final    MCHC 08/01/2022 33.3  31 - 37 g/dL Final    RDW 08/01/2022 13.8  11.5 - 14.9 % Final    Platelets 47/85/5190 280  150 - 450 k/uL Final    MPV 08/01/2022 6.8  6.0 - 12.0 fL Final    Glucose 08/01/2022 97  70 - 99 mg/dL Final    BUN 08/01/2022 8  6 - 20 mg/dL Final    Creatinine 08/01/2022 0.72  0.70 - 1.20 mg/dL Final    Calcium 08/01/2022 9.1  8.6 - 10.4 mg/dL Final    Sodium 08/01/2022 136  135 - 144 mmol/L Final    Potassium 08/01/2022 3.4 (A) 3.7 - 5.3 mmol/L Final    Chloride 08/01/2022 103  98 - 107 mmol/L Final    CO2 08/01/2022 23  20 - 31 mmol/L Final    Anion Gap 08/01/2022 10  9 - 17 mmol/L Final    Alkaline Phosphatase 08/01/2022 68  40 - 129 U/L Final    ALT 08/01/2022 32  5 - 41 U/L Final    AST 08/01/2022 25  <40 U/L Final    Total Bilirubin 08/01/2022 0.25 (A) 0.3 - 1.2 mg/dL Final    Total Protein 08/01/2022 6.8  6.4 - 8.3 g/dL Final    Albumin 08/01/2022 3.8  3.5 - 5.2 g/dL Final    GFR Non- 08/01/2022 >60  >60 mL/min Final    GFR  08/01/2022 >60  >60 mL/min Final    GFR Comment 08/01/2022        Final    Comment: Average GFR for 38-51 years old:   80 mL/min/1.73sq m  Chronic Kidney Disease:   <60 mL/min/1.73sq m  Kidney failure:   <15 mL/min/1.73sq m              eGFR calculated using average adult body mass. Additional eGFR calculator available at:        [employer]            Amphetamine Screen, Ur 08/01/2022 NEGATIVE  NEGATIVE Final    Comment:       (Positive cutoff 1000 ng/mL)                  Barbiturate Screen, Ur 08/01/2022 NEGATIVE  NEGATIVE Final    Comment:       (Positive cutoff 200 ng/mL)                  Benzodiazepine Screen, Urine 08/01/2022 NEGATIVE  NEGATIVE Final    Comment:       (Positive cutoff 200 ng/mL)                  Cocaine Metabolite, Urine 08/01/2022 NEGATIVE  NEGATIVE Final    Comment:       (Positive cutoff 300 ng/mL)                  Methadone Screen, Urine 08/01/2022 NEGATIVE  NEGATIVE Final    Comment:       (Positive cutoff 300 ng/mL)                  Opiates, Urine 08/01/2022 NEGATIVE  NEGATIVE Final    Comment:       (Positive cutoff 300 ng/mL)                  Phencyclidine, Urine 08/01/2022 NEGATIVE  NEGATIVE Final    Comment:       (Positive cutoff 25 ng/mL)                  Cannabinoid Scrn, Ur 08/01/2022 NEGATIVE  NEGATIVE Final    Comment:       (Positive cutoff 50 ng/mL)                  Oxycodone Screen, Ur 08/01/2022 NEGATIVE  NEGATIVE Final    Comment:       (Positive cutoff 100 ng/mL)                  Fentanyl, Ur 08/01/2022 POSITIVE (A) NEGATIVE Final    Comment:       (Positive cutoff  5 ng/ml)            Test Information 08/01/2022 Assay provides medical screening only.   The absence of expected drug(s) and/or metabolite(s) may indicate diluted or adulterated urine, limitations of testing or timing of collection. Final    Comment: Testing for legal purposes should be confirmed by another method. To request confirmation   of test result, please call the lab within 7 days of sample submission. TSH 08/01/2022 2.20  0.30 - 5.00 uIU/mL Final    Tricyclic Antidep,Urine 87/18/3952 NEGATIVE  NEGATIVE Final    Comment:       (Positive cutoff 1000 ng/mL)  Assay provides rapid clinical screening only. Presumptive positive results for legal   purposes should be confirmed by another method. To request confirmation, please call the   lab within 7 days of sample submission. Reviewed patient's current plan of care and vital signs with nursing staff. Labs reviewed: [x] Yes    Medications  Current Facility-Administered Medications: haloperidol (HALDOL) tablet 5 mg, 5 mg, Oral, BID  divalproex (DEPAKOTE ER) extended release tablet 500 mg, 500 mg, Oral, Daily  acetaminophen (TYLENOL) tablet 650 mg, 650 mg, Oral, Q6H PRN  ibuprofen (ADVIL;MOTRIN) tablet 400 mg, 400 mg, Oral, Q6H PRN  hydrOXYzine HCl (ATARAX) tablet 50 mg, 50 mg, Oral, TID PRN  traZODone (DESYREL) tablet 50 mg, 50 mg, Oral, Nightly PRN  polyethylene glycol (GLYCOLAX) packet 17 g, 17 g, Oral, Daily PRN  aluminum & magnesium hydroxide-simethicone (MAALOX) 200-200-20 MG/5ML suspension 30 mL, 30 mL, Oral, Q6H PRN  haloperidol lactate (HALDOL) injection 5 mg, 5 mg, IntraMUSCular, Q6H PRN **AND** diphenhydrAMINE (BENADRYL) injection 50 mg, 50 mg, IntraMUSCular, Q6H PRN  haloperidol (HALDOL) tablet 5 mg, 5 mg, Oral, Q6H PRN  nicotine (NICODERM CQ) 14 MG/24HR 1 patch, 1 patch, TransDERmal, Daily    ASSESSMENT  Schizoaffective disorder, bipolar type (HCC)         HANDOFF  Patient symptoms are: Slowly improving, remains unstable  Medications as determined by attending physician  Encourage participation in groups and milieu.   Probable discharge is to be determined by MD    Electronically signed by AGAPITO Gutierrez CNP on 8/3/2022 at 3:07 PM    **This report has been created using voice recognition software. It may contain minor errors which are inherent in voice recognition technology. **     I independently saw and evaluated the patient. I reviewed the nurse practitioners documentation above. Any additional comments or changes to the nurse practitioners documentation are stated below otherwise agree with assessment. Plan will be as follows:  Patient still requesting and receiving emergency medication in the form of Haldol. He feels that helps him. We discussed increasing Invega but ultimately settled on actually starting Haldol as a scheduled medication. We will follow patient  PLAN  Patient s symptoms   are improving  Change medication to Haldol 5 mg p.o. twice daily and discontinue Invega  Attempt to develop insight  Psycho-education conducted. Supportive Therapy conducted.   Probable discharge is undetermined at this time  Follow-up daily while on inpatient unit

## 2022-08-03 NOTE — GROUP NOTE
Group Therapy Note    Date: 8/3/2022    Group Start Time: 1100  Group End Time: 7389  Group Topic: Relaxation    [facility]      Relaxation Group Note        Date: August 3, 2022 Start Time: 11am  End Time: 11:45am      Number of Participants in Group & Unit Census:  8    Topic: Beading     Goal of Group:Patient will utilize creative expression for positive coping and relaxation. Comments:     Patient did not participate in Relaxation group, despite staff encouragement and explanation of benefits. Patient remain seclusive to self. Q15 minute safety checks maintained for patient safety and will continue to encourage patient to attend unit programming.           Signature:  Raya Nguyễn [place]

## 2022-08-03 NOTE — PROGRESS NOTES
Pharmacy Med Education Group Note    Date: 8/3/22  Start Time: 4049  End Time: 1600    Number Participants in Group:  8    Goal:  Patient will demonstrate an understanding of the medications intended purpose and possible adverse effects  Topic: [facility] [place] for Pharmacy Med Ed Group    Discipline Responsible:     OT  AT  [facility].  RT     X Other       Participation Level:     None  Minimal      X Active Listener    X Interactive    Monopolizing         Participation Quality:    X Appropriate  Inappropriate     X       Attentive        Intrusive          Sharing        Resistant          Supportive        Lethargic       Affective:     X Congruent  Incongruent  Blunted  Flat    Constricted  Anxious  Elated  Angry    Labile  Depressed  Other         Cognitive:    X Alert  Oriented PPTP     Concentration   X G  F  P   Attention Span   X G  F  P   Short-Term Memory   X G  F  P   Long-Term Memory  G  F  P   ProblemSolving/  Decision Making  G  F  P   Ability to Process  Information   X G  F  P      Contributing Factors             Delusional             Hallucinating             Flight of Ideas             Other:       Modes of Intervention:    X Education   X Support  Exploration    Clarifying  Problem Solving  Confrontation    Socialization  Limit Setting  Reality Testing    Activity  Movement  Media    Other:            Response to Learning:    X Able to verbalize current knowledge/experience    Able to verbalize/acknowledge new learning    Able to retain information    Capable of insight    Able to change behavior    Progressing to goal    Other:        Comments:   Micheal Rice RPH,PharmD,  8/3/2022, 4:22 PM

## 2022-08-03 NOTE — GROUP NOTE
Group Therapy Note    Date: 8/3/2022    Group Start Time: 1000  Group End Time: 7499  Group Topic: Psychotherapy    [address] [facility], [place]        Group Therapy Note    Attendees: 3/18     Patient refused to attend psychotherapy group at 10:00 am after encouragement from staff. 1:1 talk time provided as alternative to group session.     Discipline Responsible: /Counselor      Signature:  [place] Shankar, RENEE

## 2022-08-03 NOTE — BH NOTE
Emergency PRN Medication Administration Note:      Patient is Agitated as evidence by pacing, finger tapping on the desk and verbalization of increased agitation. Staff attempted to find and relieve the distress by Talking to patient, Offering suggestions, and Administer PRN medications Patient is currently in behavioral control, and accepting of as needed medications. Medication Administered as prescribed: Haldol 5 mg oral. Patient Tolerated medication administration. Will continue to monitor, offer support, and reassess.

## 2022-08-03 NOTE — PLAN OF CARE
Problem: Anxiety  Goal: Will report anxiety at manageable levels  Description: INTERVENTIONS:  1. Administer medication as ordered  2. Teach and rehearse alternative coping skills  3. Provide emotional support with 1:1 interaction with staff  8/2/2022 2038 by Beti Wise RN  Outcome: Progressing  Flowsheets (Taken 8/2/2022 2038)  Will report anxiety at manageable levels:   Administer medication as ordered   Provide emotional support with 1:1 interaction with staff   Teach and rehearse alternative coping skills  Note: Patient came up to desk and asked for anxiety medication. Writer discussed with pt going to group to learn coping skills to help manage anxiety. Pt declined at this time. Pt was offered emotional support, 1:1 talk time and distraction to help with anxiety. Pt still requested anxiety medication- which was given. Problem: Coping  Goal: Pt/Family able to verbalize concerns and demonstrate effective coping strategies  Description: INTERVENTIONS:  1. Assist patient/family to identify coping skills, available support systems and cultural and spiritual values  2. Provide emotional support, including active listening and acknowledgement of concerns of patient and caregivers  3. Reduce environmental stimuli, as able  4. Instruct patient/family in relaxation techniques, as appropriate  5.  Assess for spiritual pain/suffering and initiate Spiritual Care, Psychosocial Clinical Specialist consults as needed  8/2/2022 2038 by Beti Wise RN  Outcome: Progressing  Flowsheets (Taken 8/2/2022 2038)  Patient/family able to verbalize anxieties, fears, and concerns, and demonstrate effective coping:   Assist patient/family to identify coping skills, available support systems and cultural and spiritual values   Provide emotional support, including active listening and acknowledgement of concerns of patient and caregivers   Reduce environmental stimuli, as able   Instruct patient/family in relaxation techniques, as appropriate  Note: Patient came up to desk and asked for anxiety medication. Writer discussed with pt going to group to learn coping skills to help manage anxiety. Pt declined at this time. Pt was offered emotional support, 1:1 talk time and distraction to help with anxiety. Pt still requested anxiety medication- which was given.

## 2022-08-04 PROCEDURE — 90833 PSYTX W PT W E/M 30 MIN: CPT | Performed by: PSYCHIATRY & NEUROLOGY

## 2022-08-04 PROCEDURE — 6370000000 HC RX 637 (ALT 250 FOR IP): Performed by: PSYCHIATRY & NEUROLOGY

## 2022-08-04 PROCEDURE — 99232 SBSQ HOSP IP/OBS MODERATE 35: CPT | Performed by: PSYCHIATRY & NEUROLOGY

## 2022-08-04 PROCEDURE — APPSS30 APP SPLIT SHARED TIME 16-30 MINUTES

## 2022-08-04 PROCEDURE — 1240000000 HC EMOTIONAL WELLNESS R&B

## 2022-08-04 RX ORDER — TRAZODONE HYDROCHLORIDE 50 MG/1
50 TABLET ORAL NIGHTLY PRN
Qty: 30 TABLET | Refills: 0 | Status: SHIPPED | OUTPATIENT
Start: 2022-08-04

## 2022-08-04 RX ORDER — HALOPERIDOL 5 MG
5 TABLET ORAL 2 TIMES DAILY
Qty: 60 TABLET | Refills: 0 | Status: SHIPPED | OUTPATIENT
Start: 2022-08-04

## 2022-08-04 RX ORDER — HYDROXYZINE 50 MG/1
50 TABLET, FILM COATED ORAL 3 TIMES DAILY PRN
Qty: 30 TABLET | Refills: 0 | Status: SHIPPED | OUTPATIENT
Start: 2022-08-04 | End: 2022-08-14

## 2022-08-04 RX ORDER — DIVALPROEX SODIUM 500 MG/1
500 TABLET, EXTENDED RELEASE ORAL DAILY
Qty: 30 TABLET | Refills: 0 | Status: SHIPPED | OUTPATIENT
Start: 2022-08-05

## 2022-08-04 RX ADMIN — HALOPERIDOL 5 MG: 5 TABLET ORAL at 08:11

## 2022-08-04 RX ADMIN — HALOPERIDOL 5 MG: 5 TABLET ORAL at 20:24

## 2022-08-04 RX ADMIN — HYDROXYZINE HYDROCHLORIDE 50 MG: 50 TABLET, FILM COATED ORAL at 16:16

## 2022-08-04 RX ADMIN — TRAZODONE HYDROCHLORIDE 50 MG: 50 TABLET ORAL at 20:24

## 2022-08-04 RX ADMIN — POLYETHYLENE GLYCOL 3350 17 G: 17 POWDER, FOR SOLUTION ORAL at 11:27

## 2022-08-04 RX ADMIN — DIVALPROEX SODIUM 500 MG: 500 TABLET, EXTENDED RELEASE ORAL at 08:10

## 2022-08-04 RX ADMIN — HYDROXYZINE HYDROCHLORIDE 50 MG: 50 TABLET, FILM COATED ORAL at 08:14

## 2022-08-04 NOTE — PROGRESS NOTES
CLINICAL PHARMACY NOTE: MEDS TO BEDS    Total # of Prescriptions Filled: 4   The following medications were delivered to the patient:  Divalproex ER 500mg  Trazodone HCL 50mg  Hydroxyzine HCL 50mg  Haloperidol 5mg    Additional Documentation:  Delivered medications to nurses station

## 2022-08-04 NOTE — PROGRESS NOTES
Daily Progress Note  8/4/2022    Patient Name: Martha Rodriguez:  Maggie auditory hallucinations with homicidal ideation          SUBJECTIVE:      Patient is seen today for a follow up assessment. Patient is compliant with scheduled medications. Patient last required oral emergency Haldol yesterday 8/3 around 1125 AM.  Patient was becoming increasingly agitated and anxious and was requiring it however after discussion yesterday it was decided to make his Haldol scheduled in place of Invega. He is currently denying any side effects to the medication at this time. Davidstephanie Vianey reports improvement in both depression and anxiety today. He reports that he slept well last night and denies any issues with his appetite. He denies suicidal ideation. He reports improvement in homicidal ideation. He reports improvement in auditory hallucinatons and states the voices are a 2 (0-10 scale with 0 being most quiet and 10 being the loudest). He reports it just sounds like background noise at this time. He denies visual hallucinations. He denies paranoia. He denies any other medical concerns at this time. Roman Vianey reports that upon discharge he wants to return to his friends house. Appetite:  [x] Normal/Adequate/Unchanged  [] Increased  [] Decreased      Sleep:       [x] Normal/Adequate/Unchanged  [] Fair  [] Poor      Group Attendance on Unit:   [] Yes  [] Selectively    [x] No    Medication Side Effects:  Patient denies any medication side effects at the time of assessment. Mental Status Exam  Level of consciousness: Alert and awake. Appearance: Appropriate attire for setting, seated in chair, with fair  grooming and hygiene. Behavior/Motor: Approachable, calm  Attitude toward examiner: Cooperative, attentive, good eye contact. Speech: Normal rate, normal volume, normal tone. Mood:  Patient reports \"good\". Affect: Euthymic  Thought processes: Linear and coherent.    Thought content: Reports 5 - 41 U/L Final    AST 08/01/2022 25  <40 U/L Final    Total Bilirubin 08/01/2022 0.25 (A) 0.3 - 1.2 mg/dL Final    Total Protein 08/01/2022 6.8  6.4 - 8.3 g/dL Final    Albumin 08/01/2022 3.8  3.5 - 5.2 g/dL Final    GFR Non- 08/01/2022 >60  >60 mL/min Final    GFR  08/01/2022 >60  >60 mL/min Final    GFR Comment 08/01/2022        Final    Comment: Average GFR for 38-51 years old:   80 mL/min/1.73sq m  Chronic Kidney Disease:   <60 mL/min/1.73sq m  Kidney failure:   <15 mL/min/1.73sq m              eGFR calculated using average adult body mass.  Additional eGFR calculator available at:        [email or web address]            Amphetamine Screen, Ur 08/01/2022 NEGATIVE  NEGATIVE Final    Comment:       (Positive cutoff 1000 ng/mL)                  Barbiturate Screen, Ur 08/01/2022 NEGATIVE  NEGATIVE Final    Comment:       (Positive cutoff 200 ng/mL)                  Benzodiazepine Screen, Urine 08/01/2022 NEGATIVE  NEGATIVE Final    Comment:       (Positive cutoff 200 ng/mL)                  Cocaine Metabolite, Urine 08/01/2022 NEGATIVE  NEGATIVE Final    Comment:       (Positive cutoff 300 ng/mL)                  Methadone Screen, Urine 08/01/2022 NEGATIVE  NEGATIVE Final    Comment:       (Positive cutoff 300 ng/mL)                  Opiates, Urine 08/01/2022 NEGATIVE  NEGATIVE Final    Comment:       (Positive cutoff 300 ng/mL)                  Phencyclidine, Urine 08/01/2022 NEGATIVE  NEGATIVE Final    Comment:       (Positive cutoff 25 ng/mL)                  Cannabinoid Scrn, Ur 08/01/2022 NEGATIVE  NEGATIVE Final    Comment:       (Positive cutoff 50 ng/mL)                  Oxycodone Screen, Ur 08/01/2022 NEGATIVE  NEGATIVE Final    Comment:       (Positive cutoff 100 ng/mL)                  Fentanyl, Ur 08/01/2022 POSITIVE (A) NEGATIVE Final    Comment:       (Positive cutoff  5 ng/ml)            Test Information 08/01/2022 Assay provides medical screening only. The absence of expected drug(s) and/or metabolite(s) may indicate diluted or adulterated urine, limitations of testing or timing of collection. Final    Comment: Testing for legal purposes should be confirmed by another method. To request confirmation   of test result, please call the lab within 7 days of sample submission. TSH 08/01/2022 2.20  0.30 - 5.00 uIU/mL Final    Tricyclic Antidep,Urine 25/87/0643 NEGATIVE  NEGATIVE Final    Comment:       (Positive cutoff 1000 ng/mL)  Assay provides rapid clinical screening only. Presumptive positive results for legal   purposes should be confirmed by another method. To request confirmation, please call the   lab within 7 days of sample submission. Reviewed patient's current plan of care and vital signs with nursing staff. Labs reviewed: [x] Yes    Medications  Current Facility-Administered Medications: haloperidol (HALDOL) tablet 5 mg, 5 mg, Oral, BID  divalproex (DEPAKOTE ER) extended release tablet 500 mg, 500 mg, Oral, Daily  acetaminophen (TYLENOL) tablet 650 mg, 650 mg, Oral, Q6H PRN  ibuprofen (ADVIL;MOTRIN) tablet 400 mg, 400 mg, Oral, Q6H PRN  hydrOXYzine HCl (ATARAX) tablet 50 mg, 50 mg, Oral, TID PRN  traZODone (DESYREL) tablet 50 mg, 50 mg, Oral, Nightly PRN  polyethylene glycol (GLYCOLAX) packet 17 g, 17 g, Oral, Daily PRN  aluminum & magnesium hydroxide-simethicone (MAALOX) 200-200-20 MG/5ML suspension 30 mL, 30 mL, Oral, Q6H PRN  haloperidol lactate (HALDOL) injection 5 mg, 5 mg, IntraMUSCular, Q6H PRN **AND** diphenhydrAMINE (BENADRYL) injection 50 mg, 50 mg, IntraMUSCular, Q6H PRN  haloperidol (HALDOL) tablet 5 mg, 5 mg, Oral, Q6H PRN  nicotine (NICODERM CQ) 14 MG/24HR 1 patch, 1 patch, TransDERmal, Daily    ASSESSMENT  Schizoaffective disorder, bipolar type (HCC)         HANDOFF  Patient symptoms are: Modestly Improving.   Medications as determined by attending physician  Monitor need and frequency of PRN medications. Encourage participation in groups and milieu. Probable discharge is to be determined by MD.     Electronically signed by AGAPITO Butt CNP on 8/4/2022 at 1:26 PM    **This report has been created using voice recognition software. It may contain minor errors which are inherent in voice recognition technology. **    I independently saw and evaluated the patient. I reviewed the nurse practitioners documentation above. Any additional comments or changes to the nurse practitioners documentation are stated below otherwise agree with assessment. Plan will be as follows:  Spent 30 minutes with the patient, of that greater than 16 minutes was spent in supportive psychotherapy. Patient is denying side effects to medication. Reporting improvement in mood. Reports auditory hallucinations are near mumbles now, not bothering him and at or near baseline for how good they get. Discussed if continued improvement or stability through tomorrow would consider discharge and patient is in agreement  PLAN  Patient s symptoms   are improving  Continue with current medication for now  Attempt to develop insight  Psycho-education conducted. Supportive Therapy conducted.   Probable discharge is tomorrow  Follow-up daily while on inpatient unit

## 2022-08-04 NOTE — PLAN OF CARE
[address] [place] [address]  Day 3 Interdisciplinary Treatment Plan NOTE    Review Date & Time: 8/4/22 1300    Admission Type:   Admission Type: Voluntary    Reason for admission:     Estimated Length of Stay:  5-7 days  Estimated Discharge Date Update: to be determined by physician    PATIENT STRENGTHS:  Patient Strengths:   Patient Strengths and Limitations:Limitations: Difficult relationships / poor social skills, Tendency to isolate self (Patient has auditory and visual hallucinations that make it uncomfortable to be around others.)  Addictive Behavior:Addictive Behavior  In the Past 3 Months, Have You Felt or Has Someone Told You That You Have a Problem With  : None  Medical Problems:No past medical history on file. Risk:  Fall Risk   Carlos Scale Carlos Scale Score: 22  BVC    Change in scores:  No Changes to plan of Care:  No    Status EXAM:   Mental Status and Behavioral Exam  Normal: Yes  Level of Assistance: Independent/Self  Facial Expression: Brightened  Affect: Appropriate  Level of Consciousness: Alert  Frequency of Checks: 4 times per hour, close  Mood:Normal: No  Mood: Anxious  Motor Activity:Normal: Yes  Motor Activity: Decreased  Eye Contact: Good  Observed Behavior: Cooperative  Sexual Misconduct History: Current - no  Preception: [place] to person, [place] to time, [place] to place, [place] to situation  Attention:Normal: No  Attention: Distractible  Thought Processes: Blocking  Thought Content:Normal: No  Thought Content: Preoccupations  Depression Symptoms: No problems reported or observed. Anxiety Symptoms: Generalized  Lori Symptoms: No problems reported or observed.   Hallucinations: None  Delusions: No  Memory:Normal: Yes  Insight and Judgment: No  Insight and Judgment: Poor judgment, Poor insight    Daily Assessment Last Entry:   Daily Sleep (WDL): Within Defined Limits            Daily Nutrition (WDL): Within Defined Limits  Level of Assistance: Independent/Self    Patient Monitoring:  Frequency of Checks: 4 times per hour, close    Psychiatric Symptoms:   Depression Symptoms  Depression Symptoms: No problems reported or observed. Anxiety Symptoms  Anxiety Symptoms: Generalized  Lori Symptoms  Lori Symptoms: No problems reported or observed. Suicide Risk CSSR-S:  1) Within the past month, have you wished you were dead or wished you could go to sleep and not wake up? : No  2) Have you actually had any thoughts of killing yourself? : No  6) Have you ever done anything, started to do anything, or prepared to do anything to end your life?: No  Change in Result: No Change in Plan of care: No      EDUCATION:   Learner Progress Toward Treatment Goals: Reviewed results and recommendations of this team, Reviewed group plan and strategies, Reviewed signs, symptoms and risk of self harm and violent behavior, Reviewed goals and plan of care    Method: small group, individual verbal education    Outcome: Verbalized by patient but needs reinforcement to obtain goals    PATIENT GOALS:  Short term: Stay positive and focused  Long term:  Follow up with [facility] for Outpatient care    PLAN/TREATMENT RECOMMENDATIONS UPDATE:  continue with group therapies, increased socialization, continue planning for after discharge goals, continue with medication compliance    SHORT-TERM GOALS UPDATE:   Time frame for Short-Term Goals:  5-7 days    LONG-TERM GOALS UPDATE:   Time frame for Long-Term Goals:  6 months    Members Present in Team Meeting:   See signature sheet    Sonia Rios RN

## 2022-08-04 NOTE — GROUP NOTE
Group Therapy Note    Date: 8/4/2022    Group Start Time: 5772  Group End Time: 4256  Group Topic: Psychoeducation    NAI Garcia, SOFÍA    Psych-Ed/Relapse Prevention Group Note        Date: August 4, 2022 Start Time: 1:45pm End Time: 2:15      Number of Participants in Group & Unit Census:  4      Topic: Chat pack     Goal of Group:Patient will improve interpersonal communication and interactions. Comments:     Patient did not participate in Psych-Ed/Relapse Prevention group, despite staff encouragement and explanation of benefits. Patient remain seclusive to self. Q15 minute safety checks maintained for patient safety and will continue to encourage patient to attend unit programming.             Signature:  Natan Garcia, [address]

## 2022-08-04 NOTE — GROUP NOTE
Group Therapy Note    Date: 8/4/2022    Group Start Time: 1100  Group End Time: 2991  Group Topic: Recreational    SOFÍA Portillo    Recreation Group Note        Date: August 4, 2022 Start Time: 11am  End Time: 11:45am      Number of Participants in Group & Unit Census:  3    Topic: Open Leisure     Goal of Group:Patient will identify different forms of healthy recreation and leisure. Comments:     Patient did not participate in Recreation group, despite staff encouragement and explanation of benefits. Patient remain seclusive to self. Q15 minute safety checks maintained for patient safety and will continue to encourage patient to attend unit programming.               Signature:  Kadi Fletcher, [address]

## 2022-08-04 NOTE — GROUP NOTE
Group Therapy Note    Date: 8/4/2022    Group Start Time: 1000  Group End Time: 5384  Group Topic: Group Therapy    COLLEEN Husain        Group Therapy Note  Pt declined to attend psychotherapy at 1000 am despite encouragement. Pt offered 1:1 and refused.       Attendees: 5/17         Signature:  COLLEEN Sewell

## 2022-08-04 NOTE — PLAN OF CARE
Problem: Anxiety  Goal: Will report anxiety at manageable levels  Description: INTERVENTIONS:  1. Administer medication as ordered  2. Teach and rehearse alternative coping skills  3. Provide emotional support with 1:1 interaction with staff  8/4/2022 1202 by Eloisa Velasquez RN  Outcome: Progressing  Patient is alert, observed in day area. Patient is currently denying thoughts of wanting to harm self or others. Patient reports not having any tactile, gustatory, auditory, olfactory, or visual hallucinations. Patient reports anxiety and depression has improved since admission, denies paranoia or hearing voices. Patient has been visible on unit, social with select peers, not attending unit programming, was encouraged to do so. Patient reports adequate sleep and appetite. Patient is medication compliant, denies having any side effects. Hygiene fair. No further concerns voiced. Will continue to monitor.

## 2022-08-04 NOTE — GROUP NOTE
Group Therapy Note    Date: 8/4/2022    Group Start Time: 0900  Group End Time: 4745  Group Topic: Group Documentation    Ana Paula Hahn Meeting Group Note        Date: 8/4/2022  Start Time: 9am  End Time: 0930      Number of Participants in Group & Unit Census:  5/17    Topic: Goal setting group/support group    Goal of Group: Set a short term goal for the day      Comments:     Patient did not participate in [employer] group, despite staff encouragement and explanation of benefits. Patient remain seclusive to self. Q15 minute safety checks maintained for patient safety and will continue to encourage patient to attend unit programming.
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
## 2022-08-04 NOTE — GROUP NOTE
Group Therapy Note    Date: 8/4/2022    Group Start Time: 1430  Group End Time: 1468  Group Topic: Group Documentation    [facility]    Cognitive Skills Group Note        Date: 8/4/2022  Start Time: 2:30pm  End Time: 3pm      Number of Participants in Group & Unit Census:  3/13    Topic: Gratitude group    Goal of Group:Attend and participate in wellness group      Comments:     Patient did not participate in Cognitive Skills group, despite staff encouragement and explanation of benefits. Patient remain seclusive to self. Q15 minute safety checks maintained for patient safety and will continue to encourage patient to attend unit programming.

## 2022-08-05 VITALS
HEIGHT: 70 IN | OXYGEN SATURATION: 99 % | TEMPERATURE: 97.8 F | SYSTOLIC BLOOD PRESSURE: 130 MMHG | DIASTOLIC BLOOD PRESSURE: 84 MMHG | WEIGHT: 200 LBS | BODY MASS INDEX: 28.63 KG/M2 | HEART RATE: 89 BPM | RESPIRATION RATE: 14 BRPM

## 2022-08-05 PROCEDURE — 99239 HOSP IP/OBS DSCHRG MGMT >30: CPT | Performed by: PSYCHIATRY & NEUROLOGY

## 2022-08-05 PROCEDURE — 6370000000 HC RX 637 (ALT 250 FOR IP): Performed by: PSYCHIATRY & NEUROLOGY

## 2022-08-05 RX ADMIN — HALOPERIDOL 5 MG: 5 TABLET ORAL at 08:07

## 2022-08-05 RX ADMIN — DIVALPROEX SODIUM 500 MG: 500 TABLET, EXTENDED RELEASE ORAL at 08:07

## 2022-08-05 NOTE — BH NOTE
Patient given tobacco quitline number [phone] at this time, refusing to call at this time, states \" I just dont want to quit now\"- patient given information as to the dangers of long term tobacco use. Continue to reinforce the importance of tobacco cessation.

## 2022-08-05 NOTE — BH NOTE
[facility]  Discharge Note    Pt discharged with followings belongings:   Dental Appliances: None  Vision - Corrective Lenses: None  Hearing Aid: None  Jewelry: None  Body Piercings Removed: N/A  Clothing: Karyn Isles, Footwear  Other Valuables: Money   Valuables sent home with or returned to patient. Patient education on aftercare instructions:   Information faxed to [facility] by RN  at 9:05 AM .Patient verbalize understanding of AVS.    Status EXAM upon discharge:  Mental Status and Behavioral Exam  Normal: No  Level of Assistance: Independent/Self  Facial Expression: Brightened  Affect: Appropriate  Level of Consciousness: Alert  Frequency of Checks: 4 times per hour, close  Mood:Normal: No  Mood: Depressed, Anxious  Motor Activity:Normal: Yes  Motor Activity: Decreased  Eye Contact: Good  Observed Behavior: Cooperative  Sexual Misconduct History: Current - no  Preception: [place] to person, [place] to time, [place] to place, [place] to situation  Attention:Normal: No  Attention: Distractible  Thought Processes: Blocking  Thought Content:Normal: No  Thought Content: Preoccupations  Depression Symptoms: No problems reported or observed. Anxiety Symptoms: Generalized  Lori Symptoms: No problems reported or observed. Hallucinations: None  Delusions: No  Memory:Normal: Yes  Insight and Judgment: No  Insight and Judgment: Poor judgment, Poor insight    Tobacco Screening:  Practical Counseling, on admission, odc X, if applicable and completed (first 3 are required if patient doesn't refuse):             ( x) Recognizing danger situations (included triggers and roadblocks)                    ( x) Coping skills (new ways to manage stress,relaxation techniques, changing routine, distraction)                                                           ( x) Basic information about quitting (benefits of quitting, techniques in how to quit, available resources  ( ) [facility]                                                                                                                   ( ) Patient refused counseling  ( ) Patient refused referral  ( ) Patient refused prescription upon discharge  ( ) Patient has not smoked in the last 30 days    Metabolic Screening:    No results found for: LABA1C    No results found for: CHOL  No results found for: TRIG  No results found for: HDL  No components found for: LDLCAL  No results found for: Frieda Hicks, RN   Patient discharged home via his own [employer].

## 2022-08-05 NOTE — BH NOTE
Patient cooperative and states he feels ready for discharge. Patient takes medications as ordered. Patient reports he has a [address] taxi that he is calling at this time for pickup.

## 2022-08-05 NOTE — PLAN OF CARE
Problem: Anxiety  Goal: Will report anxiety at manageable levels  Description: INTERVENTIONS:  1. Administer medication as ordered  2. Teach and rehearse alternative coping skills  3. Provide emotional support with 1:1 interaction with staff  8/4/2022 2256 by Adrienne Stephen RN  Outcome: Progressing  8/4/2022 1319 by Iris Poe RN  Outcome: Progressing  8/4/2022 1202 by Tanner Gonzalez RN  Outcome: Progressing     Problem: Coping  Goal: Pt/Family able to verbalize concerns and demonstrate effective coping strategies  Description: INTERVENTIONS:  1. Assist patient/family to identify coping skills, available support systems and cultural and spiritual values  2. Provide emotional support, including active listening and acknowledgement of concerns of patient and caregivers  3. Reduce environmental stimuli, as able  4. Instruct patient/family in relaxation techniques, as appropriate  5.  Assess for spiritual pain/suffering and initiate Spiritual Care, Psychosocial Clinical Specialist consults as needed  8/4/2022 2256 by Adrienne Stephen RN  Outcome: Progressing  8/4/2022 1319 by Iris Poe RN  Outcome: Progressing
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
## 2022-08-05 NOTE — CARE COORDINATION
Abebe spoke with pt regarding his followup/outpatient services.  Pt reports he will be living with a friend in [place], [place] made followup appointment at [facility] for med clinic per his request 8/8/22 at 945am. Pt requests link to [facility] in [place] as well for AOD services, states he will be returning to [place] area in the next month and wishes to have AOD services in place in that area, ABEBE scheduled followup at [facility] for 8/8/22 3pm per his request.

## 2022-08-05 NOTE — DISCHARGE INSTRUCTIONS
Information:  Medications:   Medication summary provided   I understand that I should take only the medications on my list.     -why and when I need to take each medicine.     -which side effects to watch for.     -that I should carry my medication information at all times in case of     Emergency situations. I will take all of my medicines to follow up appointments.     -check with my physician or pharmacist before taking any new    Medication, over the counter product or drink alcohol.    -Ask about food, drug or dietary supplement interactions.    -discard old lists and update records with medication providers. Notify Physician:  Notify physician if you notice:   Always call 911 if you feel your life is in danger  In case of an emergency call 911 immediately! If 911 is not available call your local emergency medical system for help    Behavioral Health Follow Up:  Original Referral Source:MINOO Madrig[facility] Course/Progress toward goals: increased coping skills, medication compliance  Recommendations for Level of Care: follow up apt  Patient status at discharge: in control, denies any suicidal thoughts or thoughts of      harming others  [facility]  was[facility]  Aftercare plan faxed to next level of care:   -faxed by: Emile Sharif   -date: 8/5/22   -time: 08:17  Prescriptions: Filled prior to discharge    Smoking: Quit Smoking. Call the [facility]'s smoking quitline at 6-274-69G-QUIT  Know the signs of a heart attack   If you have any of the following symptoms call 911 immediately, do not wait more    Than five minutes. 1. Pressure, fullness and/ or squeezing in the center of the chest spreading to    The jaw, neck or shoulder. 2. Chest discomfort with light headedness, fainting, sweating, nausea or    Shortness of breath. 3. Upper abdominal pressure or discomfort. 4. Lower chest pain, back pain, unusual fatigue, shortness of breath, nausea   Or dizziness.      General Information:   Questions regarding your the immune system fight COVID-19. Examples include monoclonal antibodies. Blood thinners. These medicines help prevent blood clots. People with severe illness are at risk for blood clots. How can you protect yourself and others? Stay up to date on your COVID-19 vaccines. Avoid sick people, and stay away from others if you are sick. Stay at least 6 feet away from other people. Avoid crowds, especially inside. Get tested for COVID-19 before you have an indoor visit with people who don't live with you. Improve the airflow when you spend time indoors with people who don't live with you. If you can, open windows and doors. Or you can use a fan to blow air away from people and out a window. Cover your mouth with a tissue when you cough or sneeze. Wash your hands often, especially after you cough or sneeze. Use soap and water, and scrub for at least 20 seconds. If soap and water aren't available, use an alcohol-based hand . Avoid touching your mouth, nose, and eyes. Check the CDC website at cdc.gov for the most current information on how to protect yourself. And if you have questions, ask your doctor or go to cdc.govto use the COVID-19 Quarantine and Isolation Calculator. Here are some other steps you may need to take. If you are not up to date on your COVID-19 vaccines:  Wear a mask with the best fit, protection, and comfort for you. A mask can protect you even when others aren't wearing one. This might be especially important if you:  Have certain health conditions. Live with someone who has a compromised immune system. Live with someone who is not up to date on their COVID-19 vaccines. If you have been exposed to the virus AND are not up to date on your COVID-19 vaccines:  Talk to your doctor as soon as you can. Your doctor might have you take medicine to help prevent serious illness. Get a COVID-19 test. You may need to be tested more than once.  And if your test is positive, follow the instructions below. Stay home. Try to separate from other people where you live. Don't go to school, work, or public areas. Wear a well-fitting mask around other people for a full 10 days. Avoid travel, and stay away from people at high risk for serious illness. Watch for symptoms. If you have been exposed AND either tested positive for COVID-19 in the last 90 days and have recovered or you are up to date on your COVID-19 vaccines:  Talk to your doctor as soon as you can. Your doctor may have you take medicine to help prevent serious illness. Get a COVID-19 test. Wait 5 days after you were last exposed. You may need to be tested more than once. And if your test is positive, follow the instructions below. Wear a well-fitting mask around other people for a full 10 days. Avoid travel and stay away from people at high risk for serious illness. Watch for symptoms. If you tested positive for COVID-19 in the last 90 days and have not recovered, another COVID-19 test may not be needed. If you're sick or test positive for COVID-19:  Talk to your doctor as soon as you can. Your doctor may have you take medicine to help prevent serious illness. Get a COVID-19 test unless you have already been tested. You may need to be tested more than once. Stay home. Leave only if you need to get medical care. If you were seriously ill or if you have a weakened immune system, you may need to isolate for several weeks. For a full 10 days, wear a well-fitting mask whenever you're around other people. Avoid travel and stay away from people at high risk for serious illness. Limit contact with pets and people in your home. If possible, stay in a separate bedroom and use a separate bathroom. Clean and disinfect your home every day. Use household  and disinfectant wipes or sprays. Take special care to clean things that you touch with your hands. How can you self-isolate when you have COVID-19?   If you have COVID-19, there are things you can do to help avoid spreading thevirus to others. Stay home, and avoid contact with other people. Limit contact with people in your home. If possible, stay in a separate bedroom and use a separate bathroom. Wear a well-fitting mask when you are around other people. Avoid contact with pets and other animals. Cover your mouth and nose with a tissue when you cough or sneeze. Then throw it in the trash right away. Wash your hands often, especially after you cough or sneeze. Use soap and water, and scrub for at least 20 seconds. If soap and water aren't available, use an alcohol-based hand . Don't share personal household items. These include bedding, towels, cups and glasses, and eating utensils. [address] in the warmest water allowed for the fabric type, and dry it completely. It's okay to wash other people's laundry with yours. Clean and disinfect your home. Use household  and disinfectant wipes or sprays. If you have questions, visit cdc.gov to check the Quarantine and IsolationCalculator. When should you call for help? Call 911 anytime you think you may need emergency care. For example, call if you have life-threatening symptoms, such as: You have severe trouble breathing. (You can't talk at all.)     You have constant chest pain or pressure. You are severely dizzy or lightheaded. You are confused or can't think clearly. You have pale, gray, or blue-colored skin or lips. You pass out (lose consciousness) or are very hard to wake up. You have loss of balance or trouble walking. You have trouble seeing out of one or both eyes. You have weakness or drooping on one side of the face. You have weakness or numbness in an arm or a leg. You have trouble speaking. You have a severe headache. You have a seizure. Call your doctor now or seek immediate medical care if:    You have moderate trouble breathing.  (You can't speak a full sentence.)     You are coughing up blood. You have signs of low blood pressure. These include feeling lightheaded; being too weak to stand; and having cold, pale, clammy skin. Watch closely for changes in your health, and be sure to contact your doctor if:    Your symptoms get worse. You are not getting better as expected. You have new or worse symptoms of anxiety, depression, nightmares, or flashbacks. Call before you go to the doctor's office. Follow their instructions. And wear a mask. Where can you learn more? Go to https://[employer]indieeb[employer]. org and sign in to your [employer] account. Enter C008 in the [employer] box to learn more about \"Learning About Coronavirus (COVID-19). \"     If you do not have an account, please click on the \"Sign Up Now\" link. Current as of: May 28, 2022               Content Version: 13.3  © 0942-0308 [employer], Inc[employer]. Care instructions adapted under license by [facility] If you have questions about a medical condition or this instruction, always ask your healthcare professional. [place] [address] any warranty or liability for your use of this information.

## 2022-08-05 NOTE — CARE COORDINATION
Name: Oanh Campa    : 1975    Discharge Date: 22    Primary Auth/Cert #: [id number]    Destination: home     Discharge Medications:      Medication List        START taking these medications      divalproex 500 MG extended release tablet  Commonly known as: DEPAKOTE ER  Take 1 tablet by mouth in the morning. Notes to patient: For mood     haloperidol 5 MG tablet  Commonly known as: HALDOL  Take 1 tablet by mouth in the morning and at bedtime  Notes to patient: For clear thoughts     hydrOXYzine HCl 50 MG tablet  Commonly known as: ATARAX  Take 1 tablet by mouth 3 times daily as needed for Anxiety  Notes to patient: For anxiety     traZODone 50 MG tablet  Commonly known as: DESYREL  Take 1 tablet by mouth nightly as needed for Sleep  Notes to patient: For sleep            STOP taking these medications      vitamin C 500 MG tablet  Commonly known as: ASCORBIC ACID     vitamin D 25 MCG (1000 UT) Tabs tablet  Commonly known as: CHOLECALCIFEROL               Where to Get Your Medications        These medications were sent to [facility] [address] [place], [address] [facility] [address]      Phone: [phone]   divalproex 500 MG extended release tablet  haloperidol 5 MG tablet  hydrOXYzine HCl 50 MG tablet  traZODone 50 MG tablet         Follow Up Appointment: [facility]  [address], [address] [place] [address]  ph: [phone]  FAX: [phone]  Follow up on 2022  @3pm for intake assessment, therapy and AOD services    [address] [place] [address]  [phone]  Follow up on 2022  @[id number] for mental health followup/medication clinic

## 2023-01-04 LAB — GLUCOSE BLD-MCNC: 96 MG/DL (ref 70–105)
